# Patient Record
Sex: MALE | Race: WHITE | NOT HISPANIC OR LATINO | Employment: OTHER | URBAN - METROPOLITAN AREA
[De-identification: names, ages, dates, MRNs, and addresses within clinical notes are randomized per-mention and may not be internally consistent; named-entity substitution may affect disease eponyms.]

---

## 2023-01-01 ENCOUNTER — PATIENT OUTREACH (OUTPATIENT)
Dept: CARE COORDINATION | Facility: CLINIC | Age: 83
End: 2023-01-01
Payer: MEDICARE

## 2023-01-01 DIAGNOSIS — I48.91 ATRIAL FIBRILLATION WITH RVR (MULTI): ICD-10-CM

## 2023-01-01 DIAGNOSIS — D70.9: ICD-10-CM

## 2023-01-01 DIAGNOSIS — A41.9: ICD-10-CM

## 2023-01-01 LAB
ALANINE AMINOTRANSFERASE (SGPT) (U/L) IN SER/PLAS: 15 U/L (ref 10–52)
ALBUMIN (G/DL) IN SER/PLAS: 4 G/DL (ref 3.4–5)
ALKALINE PHOSPHATASE (U/L) IN SER/PLAS: 59 U/L (ref 33–136)
ANION GAP IN SER/PLAS: 12 MMOL/L (ref 10–20)
ASPARTATE AMINOTRANSFERASE (SGOT) (U/L) IN SER/PLAS: 14 U/L (ref 9–39)
BASOPHILS (10*3/UL) IN BLOOD BY AUTOMATED COUNT: 0.06 X10E9/L (ref 0–0.1)
BASOPHILS/100 LEUKOCYTES IN BLOOD BY AUTOMATED COUNT: 0.5 % (ref 0–2)
BILIRUBIN TOTAL (MG/DL) IN SER/PLAS: 0.9 MG/DL (ref 0–1.2)
CALCIUM (MG/DL) IN SER/PLAS: 9.5 MG/DL (ref 8.6–10.3)
CARBON DIOXIDE, TOTAL (MMOL/L) IN SER/PLAS: 28 MMOL/L (ref 21–32)
CHLORIDE (MMOL/L) IN SER/PLAS: 101 MMOL/L (ref 98–107)
CREATININE (MG/DL) IN SER/PLAS: 1.16 MG/DL (ref 0.5–1.3)
EOSINOPHILS (10*3/UL) IN BLOOD BY AUTOMATED COUNT: 0.42 X10E9/L (ref 0–0.4)
EOSINOPHILS/100 LEUKOCYTES IN BLOOD BY AUTOMATED COUNT: 3.4 % (ref 0–6)
ERYTHROCYTE DISTRIBUTION WIDTH (RATIO) BY AUTOMATED COUNT: 12.8 % (ref 11.5–14.5)
ERYTHROCYTE MEAN CORPUSCULAR HEMOGLOBIN CONCENTRATION (G/DL) BY AUTOMATED: 32.5 G/DL (ref 32–36)
ERYTHROCYTE MEAN CORPUSCULAR VOLUME (FL) BY AUTOMATED COUNT: 92 FL (ref 80–100)
ERYTHROCYTES (10*6/UL) IN BLOOD BY AUTOMATED COUNT: 4.77 X10E12/L (ref 4.5–5.9)
GFR MALE: 63 ML/MIN/1.73M2
GLUCOSE (MG/DL) IN SER/PLAS: 88 MG/DL (ref 74–99)
HEMATOCRIT (%) IN BLOOD BY AUTOMATED COUNT: 43.7 % (ref 41–52)
HEMOGLOBIN (G/DL) IN BLOOD: 14.2 G/DL (ref 13.5–17.5)
IMMATURE GRANULOCYTES/100 LEUKOCYTES IN BLOOD BY AUTOMATED COUNT: 0.5 % (ref 0–0.9)
LEUKOCYTES (10*3/UL) IN BLOOD BY AUTOMATED COUNT: 12.5 X10E9/L (ref 4.4–11.3)
LIPASE (U/L) IN SER/PLAS: 32 U/L (ref 9–82)
LYMPHOCYTES (10*3/UL) IN BLOOD BY AUTOMATED COUNT: 3.01 X10E9/L (ref 0.8–3)
LYMPHOCYTES/100 LEUKOCYTES IN BLOOD BY AUTOMATED COUNT: 24.1 % (ref 13–44)
MONOCYTES (10*3/UL) IN BLOOD BY AUTOMATED COUNT: 1.2 X10E9/L (ref 0.05–0.8)
MONOCYTES/100 LEUKOCYTES IN BLOOD BY AUTOMATED COUNT: 9.6 % (ref 2–10)
NEUTROPHILS (10*3/UL) IN BLOOD BY AUTOMATED COUNT: 7.75 X10E9/L (ref 1.6–5.5)
NEUTROPHILS/100 LEUKOCYTES IN BLOOD BY AUTOMATED COUNT: 61.9 % (ref 40–80)
PLATELETS (10*3/UL) IN BLOOD AUTOMATED COUNT: 299 X10E9/L (ref 150–450)
POTASSIUM (MMOL/L) IN SER/PLAS: 4 MMOL/L (ref 3.5–5.3)
PROTEIN TOTAL: 6.9 G/DL (ref 6.4–8.2)
SODIUM (MMOL/L) IN SER/PLAS: 137 MMOL/L (ref 136–145)
THYROTROPIN (MIU/L) IN SER/PLAS BY DETECTION LIMIT <= 0.05 MIU/L: 1.42 MIU/L (ref 0.44–3.98)
UREA NITROGEN (MG/DL) IN SER/PLAS: 17 MG/DL (ref 6–23)

## 2023-01-01 RX ORDER — DILTIAZEM HYDROCHLORIDE 120 MG/1
CAPSULE, EXTENDED RELEASE ORAL EVERY 24 HOURS
COMMUNITY
Start: 2023-01-01

## 2023-01-01 RX ORDER — SIMVASTATIN 20 MG/1
1 TABLET, FILM COATED ORAL NIGHTLY
COMMUNITY
Start: 2019-12-18

## 2023-01-01 RX ORDER — SPIRONOLACTONE 25 MG/1
1 TABLET ORAL DAILY
COMMUNITY
Start: 2019-12-18

## 2023-01-01 RX ORDER — AMIODARONE HYDROCHLORIDE 400 MG/1
TABLET ORAL EVERY 12 HOURS
COMMUNITY
Start: 2023-01-01 | End: 2023-01-01

## 2023-01-01 RX ORDER — METOPROLOL TARTRATE 100 MG/1
TABLET ORAL 2 TIMES DAILY
COMMUNITY
Start: 2023-01-01 | End: 2023-07-13

## 2023-01-01 RX ORDER — AMIODARONE HYDROCHLORIDE 200 MG/1
TABLET ORAL EVERY 24 HOURS
COMMUNITY
Start: 2023-01-01 | End: 2023-07-16

## 2023-01-01 RX ORDER — AMLODIPINE BESYLATE 5 MG/1
1 TABLET ORAL DAILY
COMMUNITY
Start: 2020-10-14

## 2023-01-01 RX ORDER — ATENOLOL 50 MG/1
1 TABLET ORAL DAILY
COMMUNITY
Start: 2019-12-18

## 2023-06-15 NOTE — PROGRESS NOTES
Discharge Facility:[facility]  Discharge Diagnosis:A41.9/D70.9 Neutropenic Sepsis, I48.91 Atrial fibrillation with RVR  Admission Date:6/7/23  Discharge Date: 6/14/23    PCP Appointment Date:6/27/23  Specialist Appointment Date:   Hospital Encounter and Summary: Linked  See discharge assessment below for further details    Engagement  Call Start Time: 1200 (6/15/2023 12:03 PM)    Medications  Medications reviewed with patient/caregiver?: Yes (6/15/2023 12:03 PM)  Is the patient having any side effects they believe may be caused by any medication additions or changes?: No (6/15/2023 12:03 PM)  Does the patient have all medications ordered at discharge?: Yes (6/15/2023 12:03 PM)  Care Management Interventions: Provided patient education (6/15/2023 12:03 PM)  Is the patient taking all medications as directed (includes completed medication regime)?: Yes (6/15/2023 12:03 PM)  Care Management Interventions: Provided patient education (6/15/2023 12:03 PM)  Medication Comments: see med list (6/15/2023 12:03 PM)    Appointments  Does the patient have a primary care provider?: Yes (6/15/2023 12:03 PM)  Care Management Interventions: Verified appointment date/time/provider (6/15/2023 12:03 PM)  Has the patient kept scheduled appointments due by today?: Yes (6/15/2023 12:03 PM)  Care Management Interventions: Advised patient to keep appointment; Educated on importance of keeping appointment (6/15/2023 12:03 PM)    Self Management  Has home health visited the patient within 72 hours of discharge?: Not applicable (6/15/2023 12:03 PM)    Patient Teaching  Does the patient have access to their discharge instructions?: Yes (6/15/2023 12:03 PM)  Care Management Interventions: Reviewed instructions with patient (6/15/2023 12:03 PM)  What is the patient's perception of their health status since discharge?: Same (6/15/2023 12:03 PM)  Is the patient/caregiver able to teach back the hierarchy of who to call/visit for symptoms/problems? PCP,  Specialist, Home Health nurse, Urgent Care, ED, 911: Yes (6/15/2023 12:03 PM)

## 2023-06-26 PROBLEM — M25.511 ACUTE PAIN OF RIGHT SHOULDER: Status: ACTIVE | Noted: 2023-01-01

## 2023-06-26 PROBLEM — D70.9 NEUTROPENIC SEPSIS (MULTI): Status: ACTIVE | Noted: 2023-01-01

## 2023-06-26 PROBLEM — I10 HTN (HYPERTENSION): Status: ACTIVE | Noted: 2023-01-01

## 2023-06-26 PROBLEM — K21.9 GERD (GASTROESOPHAGEAL REFLUX DISEASE): Status: ACTIVE | Noted: 2023-01-01

## 2023-06-26 PROBLEM — R79.89 LOW VITAMIN B12 LEVEL: Status: ACTIVE | Noted: 2023-01-01

## 2023-06-26 PROBLEM — R00.0 TACHYCARDIA: Status: ACTIVE | Noted: 2023-01-01

## 2023-06-26 PROBLEM — A41.9 NEUTROPENIC SEPSIS (MULTI): Status: ACTIVE | Noted: 2023-01-01

## 2023-06-26 PROBLEM — E78.5 HYPERLIPIDEMIA: Status: ACTIVE | Noted: 2023-01-01

## 2023-06-26 PROBLEM — I95.1 ORTHOSTATIC HYPOTENSION: Status: ACTIVE | Noted: 2023-01-01

## 2023-06-26 PROBLEM — C15.9 ESOPHAGEAL CANCER, STAGE IV (MULTI): Status: ACTIVE | Noted: 2023-01-01

## 2023-06-26 PROBLEM — R53.83 FATIGUE: Status: ACTIVE | Noted: 2023-01-01

## 2023-06-26 PROBLEM — E87.1 HYPONATREMIA: Status: ACTIVE | Noted: 2023-01-01

## 2023-06-26 PROBLEM — R79.0 LOW MAGNESIUM LEVEL: Status: ACTIVE | Noted: 2023-01-01

## 2023-06-26 PROBLEM — I48.91 ATRIAL FIBRILLATION WITH RVR (MULTI): Status: ACTIVE | Noted: 2023-01-01

## 2023-06-26 PROBLEM — K22.70 BARRETT'S ESOPHAGUS: Status: ACTIVE | Noted: 2023-01-01

## 2023-06-26 PROBLEM — R80.9 PROTEIN IN URINE: Status: ACTIVE | Noted: 2023-01-01

## 2023-06-26 PROBLEM — K31.7 GASTRIC POLYP: Status: ACTIVE | Noted: 2023-01-01

## 2023-06-26 PROBLEM — L27.0 RASH, DRUG: Status: ACTIVE | Noted: 2023-01-01

## 2023-06-26 PROBLEM — R63.0 DECREASED APPETITE: Status: ACTIVE | Noted: 2023-01-01

## 2023-06-26 PROBLEM — C15.5 MALIGNANT NEOPLASM OF LOWER THIRD OF ESOPHAGUS (MULTI): Status: ACTIVE | Noted: 2023-01-01
